# Patient Record
Sex: FEMALE | Race: WHITE | HISPANIC OR LATINO | URBAN - METROPOLITAN AREA
[De-identification: names, ages, dates, MRNs, and addresses within clinical notes are randomized per-mention and may not be internally consistent; named-entity substitution may affect disease eponyms.]

---

## 2017-05-26 ENCOUNTER — EMERGENCY (EMERGENCY)
Facility: HOSPITAL | Age: 42
LOS: 1 days | Discharge: HOME | End: 2017-05-26
Attending: EMERGENCY MEDICINE
Payer: COMMERCIAL

## 2017-05-26 VITALS
DIASTOLIC BLOOD PRESSURE: 74 MMHG | OXYGEN SATURATION: 99 % | HEART RATE: 78 BPM | RESPIRATION RATE: 18 BRPM | SYSTOLIC BLOOD PRESSURE: 138 MMHG | TEMPERATURE: 98 F | WEIGHT: 141.98 LBS | HEIGHT: 58 IN

## 2017-05-26 DIAGNOSIS — Z82.0 FAMILY HISTORY OF EPILEPSY AND OTHER DISEASES OF THE NERVOUS SYSTEM: ICD-10-CM

## 2017-05-26 DIAGNOSIS — E03.9 HYPOTHYROIDISM, UNSPECIFIED: ICD-10-CM

## 2017-05-26 DIAGNOSIS — G51.0 BELL'S PALSY: ICD-10-CM

## 2017-05-26 DIAGNOSIS — Z98.890 OTHER SPECIFIED POSTPROCEDURAL STATES: Chronic | ICD-10-CM

## 2017-05-26 PROCEDURE — 99284 EMERGENCY DEPT VISIT MOD MDM: CPT

## 2017-05-26 RX ORDER — VALACYCLOVIR 500 MG/1
2 TABLET, FILM COATED ORAL
Qty: 42 | Refills: 0 | OUTPATIENT
Start: 2017-05-26 | End: 2017-06-02

## 2017-05-26 RX ORDER — VALACYCLOVIR 500 MG/1
1000 TABLET, FILM COATED ORAL ONCE
Qty: 0 | Refills: 0 | Status: COMPLETED | OUTPATIENT
Start: 2017-05-26 | End: 2017-05-26

## 2017-05-26 RX ADMIN — VALACYCLOVIR 1000 MILLIGRAM(S): 500 TABLET, FILM COATED ORAL at 09:05

## 2017-05-26 RX ADMIN — Medication 60 MILLIGRAM(S): at 09:05

## 2017-05-26 NOTE — ED ADULT NURSE NOTE - OBJECTIVE STATEMENT
Patient came to the ED a/o x 3 ambulates c/o numbness in the left face since last night. Patient came to the ED a/o x 3 ambulates c/o numbness in the left face since last night. Patient right facial droop but has no motor deficits.

## 2017-05-26 NOTE — ED PROVIDER NOTE - FAMILY HISTORY
Sibling  Still living? Yes, Estimated age: Age Unknown  Family history of Bell's palsy, Age at diagnosis: Age Unknown

## 2017-05-26 NOTE — ED PROVIDER NOTE - PHYSICAL EXAMINATION
GENERAL: No acute distress, non toxic  HEAD: Atraumatic, normocephalic  EARS: Externally normal, atraumatic, TMs normal bilaterally, no lesions  EYES: No jaundice, not injected, no rupture, no foreign bodies  MOUTH: Moist mucous membranes, no open lesion, uvula midline without edema, no exudates, no peritonsilar abscess bilaterally.  NECK: Supple, full range of motion, no swelling, no lymphadenopathy  HEART: Regular rate and rhythm, no murmurs, no rubs, no gallops  LUNGS: Clear to auscultation bilaterally without rhonci, rales, or wheezing  ABDOMEN: Soft and non tender in all 4 quadrants, normal bowel sounds, no signs of trauma, no costovertebral tenderness bilaterally  BACK/SPINE: Non tender spine in cervical/thoracic/lumbar regions, no stepoffs palpable  EXTREMITIES: No gross deformities  VASCULAR: Pulses palpable in all extremities, no pitting edema, capillary refill <2 secs  SKIN: Grossly intact without rash or open wounds  PSYCH: Alert and oriented x 3  NEURO: CN 2-6 normal, left sided facial droop with forehead involvement, CN 8-12 intact, finger to nose normal, gait normal

## 2017-05-26 NOTE — ED PROVIDER NOTE - OBJECTIVE STATEMENT
42 yo F with hypothyroid that presents with left sided facial numbness/weakness x 12 hours. Noticed when picking mother up from [place]. Has never had this before. Woke up this morning was worse. Left numbness/cannot move left face including not able to move forehead and close eye. No weakness/numbness in any other part of body including arms and legs. Able to walk, eat, drink, breath without abnormalities. No trauma prior to onset. No new meds, no drugs, no hearing or vision changes. No recent illnesses, no travel. Brother has had this before and fully recovered with PT and other therapies.   PMD Dr Azevedo

## 2017-05-26 NOTE — ED PROVIDER NOTE - MEDICAL DECISION MAKING DETAILS
40 yo F with hypothyroidism that presents with left sided facial droop/numbness x 12 hours. physical exam shows bells with forehead involved. Brother has had this before and exact similar symptoms per pt. No other abnormal findings on neuro exam. Very unlikely stroke or CNS involvement. No symptoms other than reported as above in HPI. No recent illnesses, no trauma. Pt has PMD. House - Brackmann scale 3-4. Will send home with Rx for prednisone + valacyclovir + eye drops. Rec eye patch for eye protection. PMD f/u and neuro f/u with PT. Return for worsening symptoms and inability to see PMD.

## 2017-05-29 ENCOUNTER — EMERGENCY (EMERGENCY)
Facility: HOSPITAL | Age: 42
LOS: 1 days | Discharge: HOME | End: 2017-05-29
Attending: EMERGENCY MEDICINE
Payer: COMMERCIAL

## 2017-05-29 VITALS
DIASTOLIC BLOOD PRESSURE: 110 MMHG | HEART RATE: 85 BPM | TEMPERATURE: 98 F | OXYGEN SATURATION: 99 % | RESPIRATION RATE: 17 BRPM | WEIGHT: 143.08 LBS | HEIGHT: 58 IN | SYSTOLIC BLOOD PRESSURE: 149 MMHG

## 2017-05-29 VITALS — HEART RATE: 85 BPM | SYSTOLIC BLOOD PRESSURE: 153 MMHG | RESPIRATION RATE: 18 BRPM | DIASTOLIC BLOOD PRESSURE: 101 MMHG

## 2017-05-29 DIAGNOSIS — E03.9 HYPOTHYROIDISM, UNSPECIFIED: ICD-10-CM

## 2017-05-29 DIAGNOSIS — Z98.890 OTHER SPECIFIED POSTPROCEDURAL STATES: Chronic | ICD-10-CM

## 2017-05-29 DIAGNOSIS — I10 ESSENTIAL (PRIMARY) HYPERTENSION: ICD-10-CM

## 2017-05-29 DIAGNOSIS — G51.0 BELL'S PALSY: ICD-10-CM

## 2017-05-29 PROCEDURE — 99285 EMERGENCY DEPT VISIT HI MDM: CPT

## 2017-05-29 PROCEDURE — 93005 ELECTROCARDIOGRAM TRACING: CPT

## 2017-05-29 PROCEDURE — 99283 EMERGENCY DEPT VISIT LOW MDM: CPT

## 2017-05-29 NOTE — ED PROVIDER NOTE - OBJECTIVE STATEMENT
40 y/o female with significant PMHx of Lorton Palsy  presents to the ED for asymptomatic HTN eval.  Pt reports she was started on Prednisone secondary to Dx of Lorton Palsy, and then saw PMD for f/u: saw blood pressure was high and stopped the medication. Pt was supposed to get medication for HTN, however has not been Rx with medication yet. Pt is requesting HTN medication. Explained to pt that cannot start her on HTN meds, will need to f/u with PMD tomorrow. Pt denies CP, SOB, N/V/D, or any other complaints. NKDA. Currently on: valacyclovir, [id number].

## 2017-05-29 NOTE — ED ADULT TRIAGE NOTE - CHIEF COMPLAINT QUOTE
States' i was diagnosed with bells palsy on Friday ,went to PCP on Saturday for follow up, was told b/p was high and told to stop prednisone"

## 2017-05-29 NOTE — ED PROVIDER NOTE - MEDICAL DECISION MAKING DETAILS
Pt is requesting HTN medication. Explained to pt that cannot start her on HTN meds, will need to f/u with PMD tomorrow.

## 2017-05-29 NOTE — ED PROVIDER NOTE - NS ED MD SCRIBE ATTENDING SCRIBE SECTIONS
HISTORY OF PRESENT ILLNESS/HIV/PHYSICAL EXAM/PAST MEDICAL/SURGICAL/SOCIAL HISTORY/REVIEW OF SYSTEMS/DISPOSITION/VITAL SIGNS( Pullset)

## 2018-12-04 NOTE — ED ADULT NURSE NOTE - NS ED NOTE ABUSE SUSPICION NEGLECT YN
Progress Notes by Amy Jacome DO at 06/07/18 11:46 AM     Author:  Amy Jacome DO Service:  (none) Author Type:  Physician     Filed:  06/07/18 12:51 PM Encounter Date:  6/7/2018 Status:  Signed     :  Amy Jacome DO (Physician)            SUBJECTIVE:   27 year old female presents with[KS1.1T] 2 days[KS1.1M] of[KS1.1T] dysuria, frequency and urgency[KS1.1M].[KS1.1T] Pt feels the UTI's always happen following intercourse.[KS1.2M]   Review of Symptoms:[KS1.1T] negative[KS1.1M].   PMH:[KS1.1T] H[KS1.2M]ad a UTI 1 month ago- took macrobid[KS1.1M]     OBJECTIVE:    Patient appears well, afebrile.   Physical exam shows[KS1.1T] abdominal tenderness in the pelvis.[KS1.2M]   Urine dip shows[KS1.1T] blood, mod LE, and nitrites.[KS1.2M]     ASSESSMENT:[KS1.1T]   Complicated[KS1.2M] UTI    PLAN:   Urine[KS1.1T] was[KS1.2M] sent for culture.   Push fluids frequently.   Antibiotics per orders in [facility].[KS1.1T] - Ciprofloxacin 250mg PO BID x 5 days  macrobid 100mg following intercourse[KS1.2M]     Electronically Signed by:    Amy Jacome DO , 6/7/2018[KS1.3T]         Revision History        User Key Date/Time User Provider Type Action    > KS1.2 06/07/18 12:51 PM Amy Jacome DO Physician Sign     KS1.3 06/07/18 11:50 AM Amy Jacome DO Physician      KS1.1 06/07/18 11:46 AM Amy Jacome DO Physician     M - Manual, T - Template            
no

## 2021-10-05 NOTE — ED PROVIDER NOTE - GASTROINTESTINAL, MLM
Associated DX:  Primary osteoarthritis of right knee (M17.11)  Primary osteoarthritis of left knee (M17.12)    Insurance (Authorized # of Visits):  46 [facility] Physician: Dr. Eder Fortune Next MD visit: none scheduled  Fall Risk: standa as grass  · Pt will be independent and compliant with comprehensive HEP to maintain progress achieved in PT   · Pt will improve knee extension ROM to 0 deg to allow proper heel strike during gait and terminal knee extension in stance   · Pt will improve kn Abdomen soft, non-tender, no guarding.